# Patient Record
Sex: FEMALE | Race: WHITE | NOT HISPANIC OR LATINO | URBAN - METROPOLITAN AREA
[De-identification: names, ages, dates, MRNs, and addresses within clinical notes are randomized per-mention and may not be internally consistent; named-entity substitution may affect disease eponyms.]

---

## 2017-09-01 ENCOUNTER — OFFICE VISIT - HEALTHEAST (OUTPATIENT)
Dept: FAMILY MEDICINE | Facility: CLINIC | Age: 42
End: 2017-09-01

## 2017-09-01 ENCOUNTER — COMMUNICATION - HEALTHEAST (OUTPATIENT)
Dept: TELEHEALTH | Facility: CLINIC | Age: 42
End: 2017-09-01

## 2017-09-01 DIAGNOSIS — G56.03 BILATERAL CARPAL TUNNEL SYNDROME: ICD-10-CM

## 2017-09-01 DIAGNOSIS — Z00.00 HEALTH CARE MAINTENANCE: ICD-10-CM

## 2017-09-01 LAB
CHOLEST SERPL-MCNC: 148 MG/DL
FASTING STATUS PATIENT QL REPORTED: YES
HDLC SERPL-MCNC: 51 MG/DL
LDLC SERPL CALC-MCNC: 81 MG/DL
TRIGL SERPL-MCNC: 79 MG/DL

## 2017-09-01 ASSESSMENT — MIFFLIN-ST. JEOR: SCORE: 1587.67

## 2017-09-13 LAB
BKR LAB AP ABNORMAL BLEEDING: NO
BKR LAB AP BIRTH CONTROL/HORMONES: NORMAL
BKR LAB AP CERVICAL APPEARANCE: NORMAL
BKR LAB AP GYN ADEQUACY: NORMAL
BKR LAB AP GYN INTERPRETATION: NORMAL
BKR LAB AP HPV REFLEX: NORMAL
BKR LAB AP LMP: NORMAL
BKR LAB AP PATIENT STATUS: NORMAL
BKR LAB AP PREVIOUS ABNORMAL: NO
BKR LAB AP PREVIOUS NORMAL: NORMAL
HIGH RISK?: NO
PATH REPORT.COMMENTS IMP SPEC: NORMAL
RESULT FLAG (HE HISTORICAL CONVERSION): NORMAL

## 2017-10-04 ENCOUNTER — HOSPITAL ENCOUNTER (OUTPATIENT)
Dept: MAMMOGRAPHY | Facility: CLINIC | Age: 42
Discharge: HOME | End: 2017-10-04
Attending: FAMILY MEDICINE

## 2017-10-04 DIAGNOSIS — Z00.00 HEALTH CARE MAINTENANCE: ICD-10-CM

## 2020-10-17 ENCOUNTER — COMMUNICATION - HEALTHEAST (OUTPATIENT)
Dept: SCHEDULING | Facility: CLINIC | Age: 45
End: 2020-10-17

## 2020-10-19 ENCOUNTER — OFFICE VISIT - HEALTHEAST (OUTPATIENT)
Dept: UROLOGY | Facility: CLINIC | Age: 45
End: 2020-10-19

## 2020-10-19 DIAGNOSIS — N20.0 CALCULUS OF KIDNEY: ICD-10-CM

## 2020-10-19 DIAGNOSIS — N13.2 HYDRONEPHROSIS WITH URINARY OBSTRUCTION DUE TO URETERAL CALCULUS: ICD-10-CM

## 2020-10-19 DIAGNOSIS — N20.1 CALCULUS OF URETER: ICD-10-CM

## 2020-10-20 ENCOUNTER — AMBULATORY - HEALTHEAST (OUTPATIENT)
Dept: UROLOGY | Facility: CLINIC | Age: 45
End: 2020-10-20

## 2020-10-20 ENCOUNTER — COMMUNICATION - HEALTHEAST (OUTPATIENT)
Dept: UROLOGY | Facility: CLINIC | Age: 45
End: 2020-10-20

## 2020-10-20 DIAGNOSIS — N20.1 CALCULUS OF URETER: ICD-10-CM

## 2020-10-21 ENCOUNTER — AMBULATORY - HEALTHEAST (OUTPATIENT)
Dept: LAB | Facility: CLINIC | Age: 45
End: 2020-10-21

## 2020-10-21 DIAGNOSIS — N20.1 CALCULUS OF URETER: ICD-10-CM

## 2020-10-23 LAB
APPEARANCE STONE: NORMAL
COMPN STONE: NORMAL
NUMBER STONE: 2
SIZE STONE: NORMAL MM
SPECIMEN WT: 17 MG

## 2020-10-29 ENCOUNTER — OFFICE VISIT - HEALTHEAST (OUTPATIENT)
Dept: UROLOGY | Facility: CLINIC | Age: 45
End: 2020-10-29

## 2020-10-29 DIAGNOSIS — N20.0 CALCULUS OF KIDNEY: ICD-10-CM

## 2020-10-29 DIAGNOSIS — N20.1 CALCULUS OF URETER: ICD-10-CM

## 2021-05-31 VITALS — WEIGHT: 210.9 LBS | BODY MASS INDEX: 36 KG/M2 | HEIGHT: 64 IN

## 2021-06-12 NOTE — PATIENT INSTRUCTIONS - HE
Calcium Oxalate Stone Prevention Self Management    Drink more fluids:    Drinking more liquids is the best way you can help prevent future stones. Stones can form when substances in the urine are too concentrated. The more you drink, the more urine you will make. This means all substances in the urine will be less concentrated.    How much urine should I be producing?    The usual recommended daily urine production is about 2 to 3 quarts (0561-2492 ml). If you are producing more than 3 quarts of urine on a regular basis, it is possible to deplete important minerals stored in the body.    To measure the amount of urine you produce in a day, you can either:    Collect all urine in a container and measure at the end of the day     Use a measuring cup each time you urinate and add up the amounts at the end of the day     Observe    Color - Dark penny urine is concentrated. Light straw color or lighter is dilute and desirable     Odor - Concentrated urine tends to smell stronger. Dilute urine is nearly odorless    Ways to increase your fluid intake    Increasing the amount of fluid you drink is effective for all types of kidney stones. While water is commonly recommended, all fluids are effective for increasing the amount of urine your body produces.    Focus on starting a lifelong habit, rather than a short-term solution.     Keep liquids on hand that you like. Crystal Light is a low calorie appropriate choice.    Drink out of larger glasses. You'll tend to drink more with each serving.     Have an additional glass of fluid with each meal.     Keep a water or drink bottle at work and fill it regularly.     *If you are prone to fluid retention, consult your doctor before making changes to your fluid habits.    Low Oxalate Diet:    Avoid excess amounts or daily consumption of these foods:    All nuts and nut products including peanuts, almonds, pecans, peanut butter, almond milk    Rhubarb    Chocolate    Soybeans and  soy products     Spinach    Wheat Germ    Beets    Maintain a normal calcium diet:    Researches have found that people with low calcium intakes tend to have more stones. Foods with high calcium content are acceptable and include:    Dairy products (including milk, cheese and yogurt)    Meat and fish    Enriched cereals    Dark green vegetables    What about calcium supplements?     Many people take calcium supplements, either on their own or as prescribed by a doctor. Research has indicated that calcium supplements do not usually pose a risk for stone formation.  Calcium citrate is a better choice for a supplement.    Avoid excess salt:    Salt (sodium chloride) is found in abundance in many foods. High sodium levels in the urine can interfere with the kidney's handling of calcium.     Tips for reducing the salt in your diet:    Don't use salt at the table    Reduce the salt used in food preparation. Try 1/2 teaspoon when recipes call for 1 teaspoon.    Use herbs and spices for flavoring instead of salt.    Avoid salty foods.    Check the label before you buy or use a product. Note sodium and portion size information.    Try to consume less than 2,000 mg/day. (1 teaspoon = 2,000 mg)    Foods with high sodium content include:    Processed meat (including luncheon meats, sausage)     Crackers     Instant cereal     Processed cheese     Canned soups     Chips and snack foods     Soy sauce    [facility] can respond to your questions or concerns 24 hours a day at [phone].

## 2021-06-12 NOTE — PROGRESS NOTES
Assessment/Plan:     1. Health care maintenance  Age appropriate health care screening and guidance discussed including nutrition, exercise, immunization updates and vitamin supplementation.   Screening labs and exams ordered below.   - Gynecologic Cytology (PAP Smear)  - Hemoglobin  - Glucose; Future  - Lipid Cascade FASTING  - Thyroid Har[place]  - Mammo Screening Bilateral; Future  - Influenza, Seasonal,Quad Inj, 36+ MOS  - Glucose    2. Bilateral carpal tunnel syndrome  Discussed possible treatment stretch and strengthening massaging the flexor retinaculum.  Patient declines further treatment or workup at this time will let us know if symptoms worsen or do not improve.      The following high BMI interventions were performed this visit: encouragement to exercise and lifestyle education regarding diet    Subjective:      Renetta Morgan is a 42 y.o. female comes in today as a new patient to establish care and for physical.  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  She states that her last Pap smear was probably in June 2012.  Her those her last physical exam and labs.  She states that she has felt well overall so no real reason to come in.  She also complains of some numbness on her hands in the distribution of carpal tunnel mostly when she is pushing the shoulder stroller sometimes if she is writing for long periods of time and is minimal she is able to shake it out and it gets better.  She states it feels like pins and needles feeling.  Reviewed her past medical surgical social family history updated the chart as necessary.  She would like to get labs Pap smear today no changes in her breast interested in screening mammogram.  No changes in bowel or bladder no other new concerns today.  Has no concerns for birth control.    No current outpatient prescriptions on file.     No current facility-administered medications for this visit.      No  "Known Allergies  Past Medical History, Family History, and Social History reviewed.  No past medical history on file.  No past surgical history on file.  Review of patient's allergies indicates no known allergies.  No family history on file.  Social History     Social History     Marital status:      Spouse name: N/A     Number of children: N/A     Years of education: N/A     Occupational History     Not on file.     Social History Main Topics     Smoking status: Never Smoker     Smokeless tobacco: Not on file     Alcohol use Not on file     Drug use: Not on file     Sexual activity: Not on file     Other Topics Concern     Not on file     Social History Narrative     No narrative on file         Review of systems is as stated in HPI, and the remainder of the 10 system review is otherwise negative.    Objective:     Vitals:    09/01/17 0711   BP: 120/70   Patient Site: Right Arm   Patient Position: Sitting   Cuff Size: Adult Large   Pulse: 79   Resp: 14   SpO2: 99%   Weight: 210 lb 14.4 oz (95.7 kg)   Height: 5' 3.75\" (1.619 m)    Body mass index is 36.49 kg/(m^2).  Wt Readings from Last 3 Encounters:   09/01/17 210 lb 14.4 oz (95.7 kg)       General Appearance:    Alert, cooperative, no distress, appears stated age    Head:    Normocephalic, without obvious abnormality, atraumatic   Eyes:    PERRL, EOM's intact, no conjunctivitis    Ears:    Normal TM's and external ear canals   Nose:   Mucosa normal, no drainage     or sinus tenderness   Throat:   Oropharynx is clear   Neck:   Supple, symmetrical, no adenopathy, no thyromegally, no carotid bruit        Lungs:     Clear to auscultation bilaterally, respirations unlabored   Chest Wall:    No tenderness or deformity, no abnormality in breast exam    Heart:    Regular rate and rhythm, S1 and S2 normal, no murmur, rub    or gallop       Abdomen:     Soft, non-tender, bowel sounds active all four quadrants,     no masses, no organomegaly           Extremities:   " Extremities normal, atraumatic, no cyanosis or edema   Pulses:   2+ and symmetric all extremities   Neuro:   cranial nerves grossly intact, grossly normal sensation and reflexes in extremities    Psych:   grossly normal mood and affect without acute anxiety or psychosis    Skin:   No rashes or lesions   Pelvic:  There is adnexa are small mobile nontender normal external genitalia normal appearing vaginal vault no cervical motion tenderness or significant discharge.         This note has been dictated using voice recognition software. Any grammatical or context distortions are unintentional and inherent to the software.

## 2021-06-12 NOTE — TELEPHONE ENCOUNTER
c/o L lower back pain. Began abruptly today at 2:30 pm w/ no injury.Took ibuprofen 400 mg and pain resolved 7:30 pm pain returned, abruptly again, same location. Now at 10 pm pain continues, 8 out of 10, wrapping around to LLQ. Has not repeated ibuprofen. No dysuria. No hematuria. Some mild urinary frequency but not main complaint. T 98.7 orally. Advised home care - repeat ibuprofen now. c/b if pain not improved 2 hrs after ibuprofen, sooner if worse/new sx.     Reason for Disposition    Back pain    Additional Information    Negative: Passed out (i.e., lost consciousness, collapsed and was not responding)    Negative: Shock suspected (e.g., cold/pale/clammy skin, too weak to stand, low BP, rapid pulse)    Negative: Sounds like a life-threatening emergency to the triager    Negative: Major injury to the back (e.g., MVA, fall > 10 feet or 3 meters, penetrating injury, etc.)    Negative: Followed a tailbone injury    Negative: [1] Pain in the upper back over the ribs (rib cage) AND [2] radiates (travels, goes) into chest    Negative: [1] Pain in the upper back over the ribs (rib cage) AND [2] worsened by coughing (or clearly increases with breathing)    Negative: Back pain during pregnancy    Negative: Pain mainly in flank (i.e., in the side, over the lower ribs or just below the ribs)    Negative: [1] SEVERE back pain (e.g., excruciating) AND [2] sudden onset AND [3] age > 60    Negative: [1] Unable to urinate (or only a few drops) > 4 hours AND [2] bladder feels very full (e.g., palpable bladder or strong urge to urinate)    Negative: [1] Loss of bladder or bowel control (urine or bowel incontinence; wetting self, leaking stool) AND [2] new onset    Negative: Numbness in groin or rectal area (i.e., loss of sensation)    Negative: [1] SEVERE abdominal pain AND [2] present > 1 hour    Negative: [1] Abdominal pain AND [2] age > 60    Negative: Weakness of a leg or foot (e.g., unable to bear weight, dragging foot)     "Negative: Unable to walk    Negative: Patient sounds very sick or weak to the triager    Negative: [1] SEVERE back pain (e.g., excruciating, unable to do any normal activities) AND [2] not improved 2 hours after pain medicine     Has not taken ibuprofen since 7 hrs ago.    Negative: [1] Pain radiates into the thigh or further down the leg AND [2] both legs    Negative: [1] Fever > 100.0 F (37.8 C) AND [2] flank pain (i.e., in side, below ribs and above hip)    Negative: [1] Pain or burning with passing urine (urination) AND [2] flank pain (i.e., in side, below ribs and above hip)    Negative: Numbness in a leg or foot (i.e., loss of sensation)    Negative: [1] Numbness in an arm or hand (i.e., loss of sensation) AND [2] upper back pain    Negative: High-risk adult (e.g., history of cancer, HIV, or IV drug abuse)    Negative: [1] Fever AND [2] no symptoms of UTI  (Exception: has generalized muscle pains, not localized back pain)    Negative: Rash in same area as pain (may be described as \"small blisters\")    Negative: Blood in urine (red, pink, or tea-colored)    Negative: [1] MODERATE back pain (e.g., interferes with normal activities) AND [2] present > 3 days    Negative: [1] Pain radiates into the thigh or further down the leg AND [2] one leg    Negative: [1] Age > 50 AND [2] no history of prior similar back pain    Negative: Back pain present > 2 weeks    Negative: Back pain is a chronic symptom (recurrent or ongoing AND present > 4 weeks)    Negative: Caused by a twisting, bending, or lifting injury    Negative: Caused by overuse from recent vigorous activity (e.g., exercise, gardening, lifting and carrying, sports)    Protocols used: BACK PAIN-A-AH      "

## 2021-06-12 NOTE — PATIENT INSTRUCTIONS - HE
Patient Stated Goal: Pass my stone  Symptom Control While Passing A Stone    The goal of [facility] is to let a smaller kidney stone (less than 4 to 5 mm) pass without intervention if possible. Giving your body a chance to clear the stone may take a few hours up to a few weeks.  Keeping you well-informed, safe and fairly comfortable is important.    Drink to thirst  Do not attempt to  flush out  your stone by drinking too much fluid. This does not work and may increase nausea. Drink enough to satisfy your body s thirst. Eating your normal diet is fine.   Medications (that may be suggested or prescribed)    Ibuprofen (Advil or Motrin) Available over the counter  o Take two (200mg) tablets every six hours until the stone passes.  o Prevents spasm of the ureter.    o Decreases pain.      Dramamine* (drowsy version, non-generic formulation) Available over the counter  o Take 50mg at bedtime  o Decreases spasms of the ureter  o Decreases nausea  o Decreases acute pain  o Decreases recurrence of pain for 24 hours  o Will help you sleep  *This medication will cause increase drowsiness, do not drive or operate machinery for 6 hours.      Narcotics (Percocet, Vicodin, Dilaudid) Take as prescribed for severe pain unrelieved by ibuprofen and Dramamine  o Narcotics have significant side effects and only  cover-up  pain. They have no effect on the cause of pain.  o Common side effects  - Confusion, disorientation and sedation - DO NOT DRIVE OR OPERATE MACHINERY WITHIN 24 HOURS  - Nausea - take Dramamine or Zofran or Haldol to help control  - Constipation  - Sleep disturbances      Ondansetron (Zofran) Take as prescribed  o Reserve for severe nausea  o May cause constipation, start over the counter Miralax if needed      Second Line Anti-Nausea Medication: Adding a different anti-nausea medication maybe helpful for persistent nausea.  The combined effect of different types of anti-nausea medications maybe more  effective than either medication by itself, even in higher doses.  o Compazine: Take as prescribed      Information about kidney stones    Crystals can form if chemicals are too concentrated in your urine. If the crystal grows over time, a stone may form. A stone usually isn t painful while it is still in the kidney.    As the stone begins to leave the kidney, you may experience episodes of flank pain as the kidney stone approaches the entrance to the ureter. Some people feel a vague ache in the side.    Kidney stones may fall into the ureter. Some stones are tiny and pass through without causing symptoms. The ureter is a small tube (approximately 1/8 of an inch wide). A kidney stone can get stuck and block the ureter. If this happens, urine backs up and flows back to the kidney. Back pressure on the kidney can cause:  o Severe flank pain radiating to the groin.  o Severe nausea and vomiting.  o The pain can occur in the lower back, side, groin or all three.      When the stone reaches the lower ureter, this can irritate the bladder and sensations of feeling the urge to urinate frequently and urgently may occur.      Once the stone passes out of your ureter and into your bladder, the symptoms of urgency and frequency will often disappear. Sometimes pain will come back for a short period and will not be as severe as before. The passage of the stone from your bladder and out of your body is usually not a problem. The urethra is at least twice as wide as the normal ureter, so the stone doesn t usually block it.    Strain all urine  If you pass the stone, save it. Place it in the container we have provided and bring it to the [facility] within a week of passing it. Your stone will then be sent for analysis which takes about a month.     Signs and symptoms you might experience    Nausea    Decreased appetite    Urinary frequency    Bloody urine     Chills    Fatigue    When to call [facility] or  go to the Emergency Room    Fever with a temperature greater than 100.1    Severe pain    Persistent nausea/vomiting    If the pain worsens or nausea/vomiting is uncontrolled with medications, STOP eating & drinking. You need to have an empty stomach for 8 hours prior to surgery. Call the [facility] immediately at [phone].           Follow-up    Low dose CT scan with doctor visit 1-2 weeks after initial clinic visit per doctor s instructions    Please cancel the CT scan visit if you pass a stone. Reschedule for a one month follow-up with doctor to discuss stone composition and future prevention.    Preventing future stones    Approximately a month after your stone is sent out for analysis, a prevention visit will occur with your provider, to discuss an individualized plan for prevention of new stones and to discuss managing stones that you may still have. Along with the analysis of the kidney stone, blood and urine tests may be indicated to develop this plan. Knowing the type of kidney stones you make, and why, allows the providers at the [facility] to recommend specific ways to prevent them.    Follow-up visits are an important part of monitoring and preventing future re-occurrences.    [facility] is available for questions or concerns 24 hours a day at [phone]

## 2021-06-12 NOTE — PROGRESS NOTES
"Assessment/Plan:        Diagnoses and all orders for this visit:    Calculus of ureter  -     Symptom Control While Passing a Stone Education  -     CT Abdomen Pelvis Without Oral Without IV Contrast; Future; Expected date: 11/02/2020  -     Patient Stated Goal: Pass my stone    Calculus of kidney    Hydronephrosis with urinary obstruction due to ureteral calculus        Stone Management Plan  [facility] 10/19/2020   Urinary Tract Infection No suspicion of infection   Renal Colic Asymptomatic at this time   Renal Failure No suspicion of renal failure   Current CT date 10/18/2020   Right sided stones? No   R Stone Event No current event   Left sided stones? Yes   L Number of ureteral stones 1   L GSD of ureteral stones 3   L Location of ureteral stone Distal   L Number of kidney stones  1   L GSD of kidney stones < 2   L Hydronephrosis Moderate   L Stone Event New event   Diagnosis date 10/18/2020   Initial location of primary symptomatic stone Distal   Initial GSD of primary symptomatic stone 3   L MET Status Initiation   L Current Plan MET   MET 2 week F/U           Video-Visit Details  Patient has given verbal consent to a Video visit? Yes  Patient would like the video invitation sent by: Text to cell phone: [phone]  Video Start Time: 1:36 PM  Type of service:  Video Visit  Video End Time (time video stopped): 1:53 PM  Originating Location (pt. Location): Home  Distant Location (provider location):  [facility]   Mode of Communication:  Video Conference via [employer]  Isis Morse PA-C    Subjective:      The patient has been notified of following:     \"This video visit will be conducted via a call between you and your physician/provider. We have found that certain health care needs can be provided without the need for an in-person physical exam.  This service lets us provide the care you need with a video conversation.  If a prescription is necessary we can " "send it directly to your pharmacy.  If lab work is needed we can place an order for that and you can then stop by our lab to have the test done at a later time.    Video visits are billed at different rates depending on your insurance coverage. Please reach out to your insurance provider with any questions.    If during the course of the call the physician/provider feels a video visit is not appropriate, you will not be charged for this service.\"    HPI  Ms. Renetta Morgan is a 45 y.o.  female who is being evaluated via a billable video visit by [facility] [place] following [facility] ER visit for urolithiasis.    She is a first time unidentified composition stone former. She has no identified modifiable stone risk factors. She has no identified non-modifiable stone risk factors.    She was seen in ER yesterday for acute onset, worsening left flank pain x few hours duration. The pain was sharp and intermittent, radiating to the left lower abdomen. She had associated vomiting. She took tylenol and applied heat which provided some relief. Workup was notable for CT reporting an obstructing left ureteral stone. Labs were negative for infection or renal injury. She was discharged home with zofran and oxycodone.    She is tired but feeling ok at this time. She denies current symptoms of fever, chills, flank pain, nausea, vomiting, urinary frequency and dysuria.     CT scan from 10/18/20 is personally reviewed and demonstrates a 3 mm left distal ureteral stone with moderate left hydronephrosis. Tiny left renal stone.    Significant labs from presentation include no hematuria, no pyuria, negative nitrite, no bacteria, normal WBC, normal creatinine and normal potassium.    PLAN    46 yo F with obstructing left distal ureteral stone, symptoms currently controlld. Tiny left renal stone.    Will proceed with medical expulsive therapy. Risks and benefits were detailed of medical expulsive therapy " including probability of stone passage, recurrent renal colic, and requirement of emergency medical and/or surgical care and further imaging. Patient verbalized understanding. Patient agrees with plan as discussed. She will return in 2 weeks with low dose CT scan.    For symptom control, she was prescribed oxycodone and ondansetron. Over the counter symptom control medications of ibuprofen, Dramamine and Tylenol were recommended.     ROS   A 12 point comprehensive review of systems is negative except for HPI    No past medical history on file.    No past surgical history on file.    Current Outpatient Medications   Medication Sig Dispense Refill     acetaminophen (TYLENOL) 500 MG tablet Take 2 tablets (1,000 mg total) by mouth 4 (four) times a day for 7 days. 56 tablet 0     dimenhyDRINATE (DRAMAMINE) 50 MG tablet Take 1 tablet (50 mg total) by mouth at bedtime for 7 days. 7 tablet 0     dimenhyDRINATE (DRAMAMINE) 50 MG tablet Take 1 tablet (50 mg total) by mouth 4 (four) times a day as needed. 28 tablet 0     ibuprofen (ADVIL,MOTRIN) 200 MG tablet Take 2 tablets (400 mg total) by mouth 4 (four) times a day for 7 days. 56 tablet 0     multivitamin (ONE A DAY) per tablet Take 1 tablet by mouth daily.       ondansetron (ZOFRAN-ODT) 4 MG disintegrating tablet Take 1 tablet (4 mg total) by mouth every 8 (eight) hours as needed for nausea. 12 tablet 0     oxyCODONE (ROXICODONE) 5 MG immediate release tablet Every 4-6 hours as needed if pain is not improved with acetaminophen and ibuprofen. 12 tablet 0     No current facility-administered medications for this visit.        No Known Allergies    Social History     Socioeconomic History     Marital status:      Spouse name: Not on file     Number of children: Not on file     Years of education: Not on file     Highest education level: Not on file   Occupational History     Not on file   Social Needs     Financial resource strain: Not on file     Food insecurity      Worry: Not on file     Inability: Not on file     Transportation needs     Medical: Not on file     Non-medical: Not on file   Tobacco Use     Smoking status: Never Smoker     Smokeless tobacco: Never Used   Substance and Sexual Activity     Alcohol use: No     Drug use: No     Sexual activity: Not on file   Lifestyle     Physical activity     Days per week: Not on file     Minutes per session: Not on file     Stress: Not on file   Relationships     Social connections     Talks on phone: Not on file     Gets together: Not on file     Attends Oriental orthodox service: Not on file     Active member of club or organization: Not on file     Attends meetings of clubs or organizations: Not on file     Relationship status: Not on file     Intimate partner violence     Fear of current or ex partner: Not on file     Emotionally abused: Not on file     Physically abused: Not on file     Forced sexual activity: Not on file   Other Topics Concern     Not on file   Social History Narrative     Not on file       Family History   Problem Relation Age of Onset     Migraines Mother      Hypertension Father      Melanoma Father      Squamous cell carcinoma Father      Migraines Sister      Alcohol abuse Brother      Drug abuse Brother      Heart disease Brother      Hypertension Maternal Grandmother      Cancer Maternal Grandfather         leukemia     Hypertension Paternal Grandmother      Hyperlipidemia Paternal Grandmother      Diabetes Paternal Grandmother        Objective:      Physical Exam  There were no vitals filed for this visit.  General - well developed, well nourished, appropriate for age. Appears no distress at this time  Abdomen - mildly obese soft, non-tender, no hepatosplenomegaly, no masses.   - no flank tenderness, no suprapubic tenderness, kidney and bladder non-palpable  MSK - normal spinal curvature. no spinal tenderness. normal gait. muscular strength intact.  Psych - oriented to time, place, and person, normal mood  and affect.    Labs    Urinalysis POC (Office):  Nitrite, UA   Date Value Ref Range Status   10/18/2020 Negative Negative Final       Lab Urinalysis:  Blood, UA   Date Value Ref Range Status   10/18/2020 Negative Negative Final     Nitrite, UA   Date Value Ref Range Status   10/18/2020 Negative Negative Final     Leukocytes, UA   Date Value Ref Range Status   10/18/2020 Negative Negative Final     pH, UA   Date Value Ref Range Status   10/18/2020 5.0 4.5 - 8.0 Final    and Acute Labs   CBC   WBC   Date Value Ref Range Status   10/18/2020 12.8 (H) 4.0 - 11.0 thou/uL Final     Hemoglobin   Date Value Ref Range Status   10/18/2020 12.8 12.0 - 16.0 g/dL Final   09/01/2017 14.5 12.0 - 16.0 g/dL Final   05/08/2012 10.6 (L) 12.0 - 16.0 g/dL Final     Platelets   Date Value Ref Range Status   10/18/2020 284 140 - 440 thou/uL Final    and Renal Panel  [facility]  Creatinine   Date Value Ref Range Status   10/18/2020 0.93 0.60 - 1.10 mg/dL Final     Potassium   Date Value Ref Range Status   10/18/2020 4.2 3.5 - 5.0 mmol/L Final     Calcium   Date Value Ref Range Status   10/18/2020 9.5 8.5 - 10.5 mg/dL Final

## 2021-06-12 NOTE — PROGRESS NOTES
"Assessment/Plan:        Diagnoses and all orders for this visit:    Calculus of ureter    Calculus of kidney      Stone Management Plan  [facility] 10/19/2020 10/29/2020   Urinary Tract Infection No suspicion of infection No suspicion of infection   Renal Colic Asymptomatic at this time Asymptomatic at this time   Renal Failure No suspicion of renal failure No suspicion of renal failure   Current CT date 10/18/2020 -   Right sided stones? No -   R Stone Event No current event No current event   Left sided stones? Yes -   L Number of ureteral stones 1 -   L GSD of ureteral stones 3 -   L Location of ureteral stone Distal -   L Number of kidney stones  1 -   L GSD of kidney stones < 2 -   L Hydronephrosis Moderate -   L Stone Event New event Resolved event   Diagnosis date 10/18/2020 -   Initial location of primary symptomatic stone Distal -   Initial GSD of primary symptomatic stone 3 -   Resolved date - 10/19/2020   L MET Status Initiation Passed   L Current Plan MET Observe   MET 2 week F/U -   Observe rationale - Limited stone burden with good prognosis for spontaneous passage         Phone call duration: 12 minutes    Isis Morse PA-C     Subjective:      The patient has been notified of following:     \"This telephone visit will be conducted via a call between you and your physician/provider. We have found that certain health care needs can be provided without the need for a physical exam.  This service lets us provide the care you need with a short phone conversation.  If a prescription is necessary we can send it directly to your pharmacy.  If labs and/or imaging are needed, we can place orders so you can have the test (s) done at a later time.    If during the course of the call the physician/provider feels a telephone visit is not appropriate, you will not be charged for this service.\"     HPI  Ms. Renetta Morgan is a 45 y.o.  female who is being evaluated via a billable telephone " visit by [facility] [place] for medical expulsive therapy follow up.     On last encounter, her 3 mm stone was in left distal ureter with moderate hydronephrosis. She has had no unanticipated events.    She passed the stone and has analysis results to discuss. She states that 2 separate stone specimens were caught. She is asymptomatic at present. She denies symptoms of fever, chills, flank pain, nausea, vomiting, urinary frequency and dysuria.     Imaging was not performed today because she has passed stone and captured for analysis.     Stone analysis from 10/19/20 is 100% CaOx.    PLAN    46 yo F with interval passage of obstructing left distal ureteral stone. Tiny left renal stone.    She is congratulated on passing her stone and politetly defers proceeding with stone risk evaluation. She will follow up in 12 months with imaging. We reviewed conservative dietary measures for stone prevention.       ROS   Review of systems is negative except for HPI.    No past medical history on file.    No past surgical history on file.    Current Outpatient Medications   Medication Sig Dispense Refill     multivitamin (ONE A DAY) per tablet Take 1 tablet by mouth daily.       ondansetron (ZOFRAN-ODT) 4 MG disintegrating tablet Take 1 tablet (4 mg total) by mouth every 8 (eight) hours as needed for nausea. 12 tablet 0     No current facility-administered medications for this visit.      No Known Allergies    Social History     Socioeconomic History     Marital status:      Spouse name: Not on file     Number of children: Not on file     Years of education: Not on file     Highest education level: Not on file   Occupational History     Not on file   Social Needs     Financial resource strain: Not on file     Food insecurity     Worry: Not on file     Inability: Not on file     Transportation needs     Medical: Not on file     Non-medical: Not on file   Tobacco Use     Smoking status: Never Smoker      Smokeless tobacco: Never Used   Substance and Sexual Activity     Alcohol use: No     Drug use: No     Sexual activity: Not on file   Lifestyle     Physical activity     Days per week: Not on file     Minutes per session: Not on file     Stress: Not on file   Relationships     Social connections     Talks on phone: Not on file     Gets together: Not on file     Attends Catholic service: Not on file     Active member of club or organization: Not on file     Attends meetings of clubs or organizations: Not on file     Relationship status: Not on file     Intimate partner violence     Fear of current or ex partner: Not on file     Emotionally abused: Not on file     Physically abused: Not on file     Forced sexual activity: Not on file   Other Topics Concern     Not on file   Social History Narrative     Not on file     Family History   Problem Relation Age of Onset     Migraines Mother      Hypertension Father      Melanoma Father      Squamous cell carcinoma Father      Migraines Sister      Alcohol abuse Brother      Drug abuse Brother      Heart disease Brother      Hypertension Maternal Grandmother      Cancer Maternal Grandfather         leukemia     Hypertension Paternal Grandmother      Hyperlipidemia Paternal Grandmother      Diabetes Paternal Grandmother

## 2021-06-26 ENCOUNTER — HEALTH MAINTENANCE LETTER (OUTPATIENT)
Age: 46
End: 2021-06-26

## 2021-10-16 ENCOUNTER — HEALTH MAINTENANCE LETTER (OUTPATIENT)
Age: 46
End: 2021-10-16

## 2022-05-22 ENCOUNTER — HEALTH MAINTENANCE LETTER (OUTPATIENT)
Age: 47
End: 2022-05-22

## 2022-07-17 ENCOUNTER — HEALTH MAINTENANCE LETTER (OUTPATIENT)
Age: 47
End: 2022-07-17

## 2022-09-25 ENCOUNTER — HEALTH MAINTENANCE LETTER (OUTPATIENT)
Age: 47
End: 2022-09-25

## 2023-06-04 ENCOUNTER — HEALTH MAINTENANCE LETTER (OUTPATIENT)
Age: 48
End: 2023-06-04

## 2023-08-05 ENCOUNTER — HEALTH MAINTENANCE LETTER (OUTPATIENT)
Age: 48
End: 2023-08-05